# Patient Record
Sex: FEMALE | Race: BLACK OR AFRICAN AMERICAN | NOT HISPANIC OR LATINO | Employment: STUDENT | URBAN - METROPOLITAN AREA
[De-identification: names, ages, dates, MRNs, and addresses within clinical notes are randomized per-mention and may not be internally consistent; named-entity substitution may affect disease eponyms.]

---
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
## 2025-01-30 ENCOUNTER — OFFICE VISIT (OUTPATIENT)
Dept: OBSTETRICS AND GYNECOLOGY | Facility: CLINIC | Age: 20
End: 2025-01-30
Payer: MEDICAID

## 2025-01-30 VITALS
HEIGHT: 62 IN | TEMPERATURE: 96 F | DIASTOLIC BLOOD PRESSURE: 64 MMHG | WEIGHT: 214 LBS | BODY MASS INDEX: 39.38 KG/M2 | SYSTOLIC BLOOD PRESSURE: 110 MMHG

## 2025-01-30 DIAGNOSIS — R10.2 PELVIC PAIN: Primary | ICD-10-CM

## 2025-01-30 DIAGNOSIS — N89.8 VAGINAL ODOR: ICD-10-CM

## 2025-01-30 DIAGNOSIS — Z97.5 INTRAUTERINE CONTRACEPTIVE DEVICE: ICD-10-CM

## 2025-01-30 DIAGNOSIS — B96.89 BV (BACTERIAL VAGINOSIS): ICD-10-CM

## 2025-01-30 DIAGNOSIS — N76.0 BV (BACTERIAL VAGINOSIS): ICD-10-CM

## 2025-01-30 RX ORDER — METRONIDAZOLE 500 MG/1
500 TABLET ORAL 2 TIMES DAILY
Qty: 14 TABLET | Refills: 0 | Status: SHIPPED | OUTPATIENT
Start: 2025-01-30 | End: 2025-02-06

## 2025-01-30 RX ORDER — CARIPRAZINE 1.5 MG/1
1.5 CAPSULE, GELATIN COATED ORAL
COMMUNITY
Start: 2025-01-24

## 2025-01-30 RX ORDER — FLUOXETINE HYDROCHLORIDE 20 MG/1
20 CAPSULE ORAL EVERY MORNING
COMMUNITY
Start: 2025-01-23

## 2025-01-30 NOTE — PROGRESS NOTES
Chief Complaint:  Pelvic Pain    History of Present Illness:  Patient is a 19 y.o.  presents c/o a one to two month history of pelvic pain and vaginal odor. Denies change in soap or detergent. No recent antibiotic use. No change in sexual partner.         Gyn History:  Menstrual History  Cycle: No  Menarche Age: 15 years  No Cycle Reason: Medical Suppression  Medical Suppression Reason: IUD    Menopause  Menopause Age: 1 years  Post Menopausal Bleeding: No  Hormone Replacement Therapy: No    Pap History  HPV Vaccine Completed: Yes  HPV Vaccine Injection Type: 3 Injection Series    Needles  Sexually Active: Yes  Sexual Orientation: heterosexual  Postcoital Bleeding: No  Dyspareunia: No  STI History: Yes  STI Type: Chlamydia, Trichomonas  Contraception: Yes  Contraception Type: IUD (Kyleena inserted 23)    Breast History  Last Breast Imaging Date: No  History of Breast Biopsy: No      Review of systems:  General/Constitutional: Chills denies. Fatigue/weakness denies. Fever denies. Night sweats denies. Hot flashes denies  Breast: Dimpling denies. Breast mass denies. Breast pain/tenderness denies. Nipple discharge denies. Puckering denies.  Gastrointestinal: Abdominal pain denies. Blood in stool denies. Constipation denies. Diarrhea denies. Heartburn denies. Nausea denies. Vomiting denies   Genitourinary: Incontinence denies. Blood in urine denies. Frequent urination denies. Urgency denies. Painful urination denies. Nocturia denies   Gynecologic: Irregular menses denies. Heavy bleeding denies. Painful menses denies. Vaginal discharge denies. Vaginal odor admits. Vaginal itching/Irritation denies. Vaginal lesion denies.  Pelvic pain admits. Decreased libido denies. Vulvar lesion denies. Prolapse of genital organs denies. Painful intercourse denies. Postcoital bleeding denies   Psychiatric: Mood lability denies. Depressed mood denies. Suicidal thoughts denies. Anxiety denies. Overwhelmed denies. Appetite  "normal. Energy level normal.     OB History    Para Term  AB Living   0 0 0 0 0 0   SAB IAB Ectopic Multiple Live Births   0 0 0 0 0      The patient has never been pregnant.    Past Medical History:   Diagnosis Date    Allergies     Depression     IUD (intrauterine device) in place 2023    Kyleena inserted  23.       Current Outpatient Medications:     FLUoxetine 20 MG capsule, Take 20 mg by mouth every morning., Disp: , Rfl:     KYLEENA 17.5 mcg/24 hrs (5 yrs) 19.5 mg IUD, 1 each., Disp: , Rfl:     loratadine (CLARITIN) 10 mg tablet, Take 10 mg by mouth once daily., Disp: , Rfl:     phenoL (CHLORASEPTIC THROAT SPRAY) 1.4 % SprA, by Mucous Membrane route as needed., Disp: 20 mL, Rfl: 0    VRAYLAR 1.5 mg Cap, Take 1.5 mg by mouth., Disp: , Rfl:     metroNIDAZOLE (FLAGYL) 500 MG tablet, Take 1 tablet (500 mg total) by mouth 2 (two) times a day. for 7 days, Disp: 14 tablet, Rfl: 0      Physical Exam:  /64   Temp 96.4 °F (35.8 °C)   Ht 5' 2" (1.575 m)   Wt 97.1 kg (214 lb)   BMI 39.14 kg/m²     Chaperone present.       Constitutional: General appearance: healthy, well-nourished and well-developed  Psychiatric:  Orientation to time, place and person. Normal mood and affect and active, alert   Abdomen: Auscultation/Inspection/Palpation: No tenderness or masses. Soft, nondistended      Female Genitalia:      Vulva: no masses, atrophy or lesions      Bladder/Urethra: no urethral discharge or mass, normal meatus, bladder non-distended.      Vagina: no tenderness, erythema, cystocele, rectocele, or vesicle(s) or ulcers; +white vaginal discharge on exam     Cervix: no discharge or cervical motion tenderness and grossly normal; +IUD strings in place     Uterus: normal size and shape and midline, non-tender, and no uterine prolapse.     Adnexa/Parametria: no parametrial tenderness or mass, no adnexal tenderness or ovarian mass.         Assessment/Plan:  1. Pelvic pain  Educated    NSAIDs, " heating pad, warm bath  Aleve gel caps  Pain/ER precautions   Cultures: gc/cz/tv/myco/urea  If pain persists, will order pelvic US    2. Vaginal odor, BV  Educated   Wet prep: clue + whiff   Flagyl 500mg BID x7 days  No douching   Call office if no improvement in 72 hours     AVOID: Scented soaps or shampoos, bubble bath, scented detergents, feminine sprays, douches, powders          Fragrance-free pH neutral soap     Unscented detergents     3. Intrauterine contraceptive device  Normal exam; strings noted on cervix  Educated on bleeding patterns with Mirena IUD  Gave bleeding precautions  Discussed with patient that birth control does not protect against STIs  Safe sex education  Educated no longer effective after 8 years           If no improvement with pain, will send out pelvic US

## 2025-02-07 DIAGNOSIS — Z22.39 CARRIER OF UREAPLASMA UREALYTICUM: Primary | ICD-10-CM

## 2025-02-07 DIAGNOSIS — A49.3 MYCOPLASMA INFECTION: ICD-10-CM

## 2025-02-07 RX ORDER — DOXYCYCLINE 100 MG/1
100 CAPSULE ORAL 2 TIMES DAILY
Qty: 14 CAPSULE | Refills: 0 | Status: SHIPPED | OUTPATIENT
Start: 2025-02-07 | End: 2025-02-14

## 2025-02-07 RX ORDER — DOXYCYCLINE 100 MG/1
100 CAPSULE ORAL 2 TIMES DAILY
Qty: 14 CAPSULE | Refills: 0 | Status: SHIPPED | OUTPATIENT
Start: 2025-02-07 | End: 2025-02-07

## 2025-02-26 ENCOUNTER — OFFICE VISIT (OUTPATIENT)
Dept: URGENT CARE | Facility: CLINIC | Age: 20
End: 2025-02-26
Payer: MEDICAID

## 2025-02-26 VITALS
OXYGEN SATURATION: 98 % | RESPIRATION RATE: 18 BRPM | WEIGHT: 214 LBS | HEIGHT: 62 IN | BODY MASS INDEX: 39.38 KG/M2 | HEART RATE: 64 BPM | SYSTOLIC BLOOD PRESSURE: 113 MMHG | TEMPERATURE: 98 F | DIASTOLIC BLOOD PRESSURE: 75 MMHG

## 2025-02-26 DIAGNOSIS — J06.9 UPPER RESPIRATORY INFECTION WITH COUGH AND CONGESTION: ICD-10-CM

## 2025-02-26 DIAGNOSIS — R68.89 INFLUENZA-LIKE SYMPTOMS: ICD-10-CM

## 2025-02-26 DIAGNOSIS — J11.1 INFLUENZA-LIKE ILLNESS: Primary | ICD-10-CM

## 2025-02-26 DIAGNOSIS — R50.9 FEVER, UNSPECIFIED FEVER CAUSE: ICD-10-CM

## 2025-02-26 LAB
CTP QC/QA: YES
CTP QC/QA: YES
POC MOLECULAR INFLUENZA A AGN: NEGATIVE
POC MOLECULAR INFLUENZA B AGN: NEGATIVE
SARS CORONAVIRUS 2 ANTIGEN: NEGATIVE

## 2025-02-26 RX ORDER — OSELTAMIVIR PHOSPHATE 75 MG/1
75 CAPSULE ORAL 2 TIMES DAILY
Qty: 10 CAPSULE | Refills: 0 | Status: SHIPPED | OUTPATIENT
Start: 2025-02-26 | End: 2025-03-03

## 2025-02-26 NOTE — LETTER
February 26, 2025      Urgent Care - [address] [address]  Phone: [phone]  Fax: [phone]       Patient: Yordan Foy   YOB: 2005  Date of Visit: 02/26/2025    To Whom It May Concern:    Aaron Foy  was at [facility] on 02/26/2025. The patient may return to work/school on 3/3/2025 with no restrictions. If you have any questions or concerns, or if I can be of further assistance, please do not hesitate to contact me.    Sincerely,    Hailee Vallejo NP

## 2025-02-26 NOTE — PATIENT INSTRUCTIONS
Please follow up with your primary care provider within 2-5 days if your signs and symptoms have not resolved or worsen.     If your condition worsens or fails to improve we recommend that you receive another evaluation at the emergency room immediately or contact your primary medical clinic to discuss your concerns.   You must understand that you have received an Urgent Care treatment only and that you may be released before all of your medical problems are known or treated. You, the patient, will arrange for follow up care as instructed.     Start Tamiflu today and take to completion.  Alternate tylenol/Motrin as needed for fever.  Increase oral fluids.  Rest.  OTC Jigar Med sinus rinses.    You have tested NEGATIVE for COVID-19 today, however it is recommended that you repeat testing using a OTC self test kit in the next 1-2 days for confirmation.  The CDC encourages you to follow these strategies to help prevent the spread of Respiratory Viruses when you are sick:  -Stay home and away from others until you are FEVER FREE (without the use of fever reducing medications) AND your symptoms have improved

## 2025-02-26 NOTE — PROGRESS NOTES
"Subjective:      Patient ID: Yordan Foy is a 19 y.o. female.    Vitals:  height is 5' 2" (1.575 m) and weight is 97.1 kg (214 lb). Her oral temperature is 98.2 °F (36.8 °C). Her blood pressure is 113/75 and her pulse is 64. Her respiration is 18 and oxygen saturation is 98%.     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   HENT:  Positive for congestion, postnasal drip and sinus pressure. Negative for sore throat.    Neck: Negative for neck pain, neck stiffness and neck swelling.   Cardiovascular:  Negative for chest trauma, chest pain, leg swelling and palpitations.   Respiratory:  Positive for cough. Negative for sputum production, shortness of breath, wheezing and asthma.    Musculoskeletal:  Positive for muscle ache.   Skin:  Negative for color change, pale and rash.   Allergic/Immunologic: Negative for asthma.   Neurological:  Positive for headaches. Negative for dizziness, disorientation and altered mental status.   Psychiatric/Behavioral:  Negative for altered mental status, disorientation and confusion.       Objective:     Physical Exam   Constitutional: She is oriented to person, place, and time.  Non-toxic appearance. She appears ill. No distress.   HENT:   Head: Normocephalic and atraumatic.   Nose: Mucosal edema and congestion present. No rhinorrhea. Right sinus exhibits maxillary sinus tenderness. Left sinus exhibits maxillary sinus tenderness.   Mouth/Throat: Uvula is midline and mucous membranes are normal. Mucous membranes are moist. No trismus in the jaw. No uvula swelling. Posterior oropharyngeal erythema and cobblestoning present. No oropharyngeal exudate. No tonsillar exudate.   Neck: No " neck rigidity present.   Cardiovascular: Normal rate and normal pulses.   Pulmonary/Chest: Effort normal. No respiratory distress. She has no wheezes. She has no rhonchi. She has no rales.   Musculoskeletal: Normal range of motion.         General: Normal range of motion.   Lymphadenopathy:     She has cervical adenopathy.        Right cervical: Superficial cervical adenopathy present.        Left cervical: Superficial cervical adenopathy present.   Neurological: She is alert and oriented to person, place, and time.   Skin: Skin is warm, dry and not diaphoretic.   Psychiatric: Her behavior is normal. Mood normal.   Nursing note and vitals reviewed.      Assessment:     1. Influenza-like illness    2. Influenza-like symptoms    3. Upper respiratory infection with cough and congestion    4. Fever, unspecified fever cause        Plan:     Office Visit on 02/26/2025   Component Date Value Ref Range Status    POC Molecular Influenza A Ag 02/26/2025 Negative  Negative Final    POC Molecular Influenza B Ag 02/26/2025 Negative  Negative Final     Acceptable 02/26/2025 Yes   Final    SARS Coronavirus 2 Antigen 02/26/2025 Negative  Negative, Presumptive Negative Final     Acceptable 02/26/2025 Yes   Final       Influenza-like illness  -     dexbrompheniramine-phenylep-DM 2-10-20 mg Tab; Take 1 tablet by mouth every 6 to 8 hours as needed (congestion/cough/allergy symptoms).  Dispense: 15 tablet; Refill: 0  -     oseltamivir (TAMIFLU) 75 MG capsule; Take 1 capsule (75 mg total) by mouth 2 (two) times daily. for 5 days  Dispense: 10 capsule; Refill: 0    Influenza-like symptoms  -     dexbrompheniramine-phenylep-DM 2-10-20 mg Tab; Take 1 tablet by mouth every 6 to 8 hours as needed (congestion/cough/allergy symptoms).  Dispense: 15 tablet; Refill: 0  -     oseltamivir (TAMIFLU) 75 MG capsule; Take 1 capsule (75 mg total) by mouth 2 (two) times daily. for 5 days  Dispense: 10 capsule; Refill:  0    Upper respiratory infection with cough and congestion  -     dexbrompheniramine-phenylep-DM 2-10-20 mg Tab; Take 1 tablet by mouth every 6 to 8 hours as needed (congestion/cough/allergy symptoms).  Dispense: 15 tablet; Refill: 0  -     oseltamivir (TAMIFLU) 75 MG capsule; Take 1 capsule (75 mg total) by mouth 2 (two) times daily. for 5 days  Dispense: 10 capsule; Refill: 0    Fever, unspecified fever cause  -     POCT Influenza A/B MOLECULAR  -     SARS Coronavirus 2 Antigen, POCT Manual Read          Medical Decision Making:   Urgent Care Management:  Given pt's recent HPI, and clinical exam findings which are consistent with influenza diagnosis, I plan to initiate antiviral treatment using (Tamiflu/Xofluza) despite negative influenza POCT result.  Discussed treatment options with patient. Patient expressed verbal understanding and agreement with treatment plan.   Patient Instructions   Please follow up with your primary care provider within 2-5 days if your signs and symptoms have not resolved or worsen.     If your condition worsens or fails to improve we recommend that you receive another evaluation at the emergency room immediately or contact your primary medical clinic to discuss your concerns.   You must understand that you have received an Urgent Care treatment only and that you may be released before all of your medical problems are known or treated. You, the patient, will arrange for follow up care as instructed.     Start Tamiflu today and take to completion.  Alternate tylenol/Motrin as needed for fever.  Increase oral fluids.  Rest.  OTC Jigar Med sinus rinses.    You have tested NEGATIVE for COVID-19 today, however it is recommended that you repeat testing using a OTC self test kit in the next 1-2 days for confirmation.  The CDC encourages you to follow these strategies to help prevent the spread of Respiratory Viruses when you are sick:  -Stay home and away from others until you are FEVER FREE  (without the use of fever reducing medications) AND your symptoms have improved

## 2025-04-03 NOTE — PROGRESS NOTES
Chief Complaint:   Vaginal Bleeding     History of Present Illness:  Yordan Foy is a 19 y.o. year old  presents for her well woman exam. Currently relying on Kyleena for birth control, placed 23. C/o a two week history of occasional light pink spotting when wiping. First occurrence since Kyleena placement 23. Recently treated for mycoplasma hominis and ureaplasma 2025. Denies bleeding after intercourse. No new sexual partner.    Also c/o occ RLQ pain. None currently. Mild relief with NSAIDs when present.     Gyn History:  Menstrual History  Cycle: No  Menarche Age: 15 years  No Cycle Reason: Other  Other Reason: no cycle w/ Kyleena IUD    Menopause  Menopause Age: 1 years  Post Menopausal Bleeding: No  Hormone Replacement Therapy: No    Pap History  History of Abnormal Pap: No  HPV Vaccine Completed: Yes  HPV Vaccine Injection Type: 3 Injection Series    [place]  Sexually Active: Yes  Sexual Orientation: heterosexual  Postcoital Bleeding: No  Dyspareunia: No  STI History: No  Contraception: Yes  Contraception Type: IUD    Breast History  Last Breast Imaging Date: No  History of Breast Biopsy: No        Review of Systems:  General/Constitutional: Chills denies. Fatigue/weakness denies. Fever denies. Night sweats denies. Hot flashes denies    Respiratory: Cough denies. Hemoptysis denies. SOB denies. Sputum production denies. Wheezing denies .   Cardiovascular: Chest pain denies. Dizziness denies. Palpitations denies. Swelling in hands/feet denies.                Breast: Dimpling denies. Breast mass denies. Breast pain/tenderness denies. Nipple discharge denies. Puckering denies.  Gastrointestinal: Abdominal pain denies. Blood in stool denies. Constipation denies. Diarrhea denies. Heartburn denies. Nausea denies. Vomiting denies    Genitourinary: Incontinence denies. Blood in urine denies. Frequent urination denies. Painful urination denies. Urinary urgency denies. Nocturia denies   "  Gynecologic: Irregular menses admits. Heavy bleeding denies. Painful menses denies. Vaginal discharge denies. Vaginal odor denies. Vaginal itching denies. Vaginal lesion denies. Pelvic pain denies. Decreased libido denies. Vulvar lesion denies. Prolapse of genital organs denies. Painful intercourse denies. Postcoital bleeding denies    Psychiatric: Depression denies. Anxiety denies.     OB History    Para Term  AB Living   0 0 0 0 0 0   SAB IAB Ectopic Multiple Live Births   0 0 0 0 0      The patient has never been pregnant.    Past Medical History:   Diagnosis Date    Allergies     Depression     IUD (intrauterine device) in place 2023    Kyleena inserted  23.     Current Medications[1]    Review of patient's allergies indicates:   Allergen Reactions    Penicillins Rash       Past Surgical History:   Procedure Laterality Date    INTRAUTERINE DEVICE INSERTION  2023    Kyleena     Family History   Problem Relation Name Age of Onset    Breast cancer Maternal Grandmother  50 - 59    Breast cancer Maternal Aunt  30 - 39    Colon cancer Neg Hx      Ovarian cancer Neg Hx      Uterine cancer Neg Hx      Cervical cancer Neg Hx       Social History[2]    Physical Exam:  /70 (BP Location: Right arm, Patient Position: Sitting)   Ht 5' 2" (1.575 m)   Wt 98.8 kg (217 lb 12.8 oz)   BMI 39.84 kg/m²     General appearance: healthy, well-nourished and well-developed     Psychiatric: Orientation to time, place and person. Normal mood and affect and active, alert     Skin: Appearance: no rashes or lesions.     Neck:   Neck: supple, FROM, trachea midline. and no masses   Thyroid: no enlargement or nodules and non-tender.     Cardiovascular:  Auscultation: RRR and no murmur.   Peripheral Vascular: no varicosities, LLE edema, RLE edema, calf tenderness, and palpable cord and pedal pulses intact.     Lungs:   Respiratory effort: no intercostal retractions or accessory muscle usage.   Auscultation: " no wheezing, rales/crackles, or rhonchi and clear to auscultation.     Breast: deferred.     Abdomen:   Auscultation/Inspection/Palpation: no hepatomegaly, splenomegaly, masses, tenderness or CVA tenderness and soft, non-distended bowel sounds preset.    Hernia: no palpable hernias.     Chaperone present.        Female Genitalia:      Vulva: no masses, atrophy or lesions      Bladder/Urethra: no urethral discharge or mass, normal meatus, bladder non-distended.      Vagina: no tenderness, erythema, cystocele, rectocele, abnormal vaginal discharge, or vesicle(s) or ulcers     Cervix: no discharge or cervical motion tenderness and grossly normal STRINGS ON EXAM     Uterus: normal size and shape and midline, non-tender, and no uterine prolapse.     Adnexa/Parametria: no parametrial tenderness or mass, no adnexal tenderness or ovarian mass.            Assessment/Plan:  1. Well woman exam  gc/cz/tv  Healthy diet, exercise  Multivitamin  Seat belt  Sunscreen use  Safe sex education  Contraception education: kyleena 5/4/23  STI education   Gardasil evaluation: 3/3    2. Breakthrough bleeding with IUD h/o mycoplasma hominis and ureaplasma 2/2025  Normal exam; strings noted on cervix  Educated on bleeding patterns with Kyleena IUD, inserted 5/4/23  Gave bleeding precautions  Discussed with patient that birth control does not protect against STIs  Safe sex education  Educated no longer effective after 5 years    mycoplasma hominis and ureaplasma + 2/2025  Cultures: gc/cz/tv/myco/urea  Pelvic US  RTC 2 weeks    3. BMI 39.0-39.9,adult  Weight loss w/ healthy diet and exercise modifications           This note was transcribed by Kaitlin Whitt MA. There may be transcription errors as a result, however minimal. I agree with transcription and every effort has been made to ensure accuracy of transcription, but any obvious errors or omissions should be clarified with the author of the document.               [1]   Current Outpatient  Medications:     FLUoxetine 20 MG capsule, Take 20 mg by mouth every morning., Disp: , Rfl:     KYLEENA 17.5 mcg/24 hrs (5 yrs) 19.5 mg IUD, 1 each., Disp: , Rfl:     VRAYLAR 1.5 mg Cap, Take 1.5 mg by mouth., Disp: , Rfl:     dexbrompheniramine-phenylep-DM 2-10-20 mg Tab, Take 1 tablet by mouth every 6 to 8 hours as needed (congestion/cough/allergy symptoms). (Patient not taking: Reported on 4/8/2025), Disp: 15 tablet, Rfl: 0    loratadine (CLARITIN) 10 mg tablet, Take 10 mg by mouth once daily. (Patient not taking: Reported on 4/8/2025), Disp: , Rfl:     phenoL (CHLORASEPTIC THROAT SPRAY) 1.4 % SprA, by Mucous Membrane route as needed. (Patient not taking: Reported on 4/8/2025), Disp: 20 mL, Rfl: 0  [2]   Social History  Socioeconomic History    Marital status: Single   Tobacco Use    Smoking status: Never    Smokeless tobacco: Never   Substance and Sexual Activity    Alcohol use: Not Currently    Drug use: Never    Sexual activity: Yes     Partners: Male     Birth control/protection: I.U.D.     Comment: STI: CZ, TV

## 2025-04-08 ENCOUNTER — OFFICE VISIT (OUTPATIENT)
Dept: OBSTETRICS AND GYNECOLOGY | Facility: CLINIC | Age: 20
End: 2025-04-08
Payer: MEDICAID

## 2025-04-08 VITALS
HEIGHT: 62 IN | SYSTOLIC BLOOD PRESSURE: 118 MMHG | BODY MASS INDEX: 40.08 KG/M2 | DIASTOLIC BLOOD PRESSURE: 70 MMHG | WEIGHT: 217.81 LBS

## 2025-04-08 DIAGNOSIS — B96.89 PELVIC INFLAMMATORY DISEASE DUE TO MYCOPLASMA HOMINIS: ICD-10-CM

## 2025-04-08 DIAGNOSIS — N92.1 BREAKTHROUGH BLEEDING WITH IUD: ICD-10-CM

## 2025-04-08 DIAGNOSIS — Z11.3 ENCOUNTER FOR SCREENING FOR INFECTIONS WITH PREDOMINANTLY SEXUAL MODE OF TRANSMISSION: ICD-10-CM

## 2025-04-08 DIAGNOSIS — N73.9 PELVIC INFLAMMATORY DISEASE DUE TO MYCOPLASMA HOMINIS: ICD-10-CM

## 2025-04-08 DIAGNOSIS — A49.3 NGU DUE TO UREAPLASMA UREALYTICUM: ICD-10-CM

## 2025-04-08 DIAGNOSIS — Z01.419 WELL WOMAN EXAM: Primary | ICD-10-CM

## 2025-04-08 DIAGNOSIS — N34.1 NGU DUE TO UREAPLASMA UREALYTICUM: ICD-10-CM

## 2025-04-08 DIAGNOSIS — Z97.5 BREAKTHROUGH BLEEDING WITH IUD: ICD-10-CM
